# Patient Record
Sex: MALE | Race: WHITE | NOT HISPANIC OR LATINO | Employment: OTHER | URBAN - METROPOLITAN AREA
[De-identification: names, ages, dates, MRNs, and addresses within clinical notes are randomized per-mention and may not be internally consistent; named-entity substitution may affect disease eponyms.]

---

## 2018-03-06 ENCOUNTER — TELEPHONE (OUTPATIENT)
Dept: MEDICAL GROUP | Facility: MEDICAL CENTER | Age: 83
End: 2018-03-06

## 2018-03-06 NOTE — TELEPHONE ENCOUNTER
1. Caller Name: Kenyon Garza                        Call Back Number: [phone] (home)       2. Message: Pt left message with .  Pt would like to speak with you, wants some professional advise.     3. Patient approves office to leave a detailed voicemail/MyChart message: no

## 2018-03-07 NOTE — TELEPHONE ENCOUNTER
Patient states he was diagnosed with cancer and had some questions, I answered his questions, advised him to follow up with his PCP as scheduled tomorrow  He understands and agrees, advised him he could call us anytime.

## 2021-01-14 DIAGNOSIS — Z23 NEED FOR VACCINATION: ICD-10-CM
